# Patient Record
Sex: MALE | Race: WHITE | Employment: FULL TIME | URBAN - METROPOLITAN AREA
[De-identification: names, ages, dates, MRNs, and addresses within clinical notes are randomized per-mention and may not be internally consistent; named-entity substitution may affect disease eponyms.]

---

## 2023-08-29 ENCOUNTER — APPOINTMENT (OUTPATIENT)
Dept: GENERAL RADIOLOGY | Age: 28
End: 2023-08-29
Payer: COMMERCIAL

## 2023-08-29 ENCOUNTER — HOSPITAL ENCOUNTER (EMERGENCY)
Age: 28
Discharge: ANOTHER HOSPITAL | End: 2023-08-29
Attending: EMERGENCY MEDICINE
Payer: COMMERCIAL

## 2023-08-29 VITALS
SYSTOLIC BLOOD PRESSURE: 143 MMHG | HEART RATE: 77 BPM | TEMPERATURE: 98.3 F | DIASTOLIC BLOOD PRESSURE: 78 MMHG | RESPIRATION RATE: 18 BRPM | OXYGEN SATURATION: 97 %

## 2023-08-29 DIAGNOSIS — S62.603B: ICD-10-CM

## 2023-08-29 DIAGNOSIS — S68.119A AMPUTATION OF FINGER WITHOUT COMPLICATION, INITIAL ENCOUNTER: Primary | ICD-10-CM

## 2023-08-29 PROCEDURE — 99285 EMERGENCY DEPT VISIT HI MDM: CPT

## 2023-08-29 PROCEDURE — 6360000002 HC RX W HCPCS: Performed by: EMERGENCY MEDICINE

## 2023-08-29 PROCEDURE — 73140 X-RAY EXAM OF FINGER(S): CPT

## 2023-08-29 PROCEDURE — 96365 THER/PROPH/DIAG IV INF INIT: CPT

## 2023-08-29 PROCEDURE — 96375 TX/PRO/DX INJ NEW DRUG ADDON: CPT

## 2023-08-29 PROCEDURE — 90471 IMMUNIZATION ADMIN: CPT | Performed by: EMERGENCY MEDICINE

## 2023-08-29 PROCEDURE — 96361 HYDRATE IV INFUSION ADD-ON: CPT

## 2023-08-29 PROCEDURE — 90715 TDAP VACCINE 7 YRS/> IM: CPT | Performed by: EMERGENCY MEDICINE

## 2023-08-29 PROCEDURE — 2580000003 HC RX 258: Performed by: EMERGENCY MEDICINE

## 2023-08-29 PROCEDURE — 96376 TX/PRO/DX INJ SAME DRUG ADON: CPT

## 2023-08-29 RX ORDER — ONDANSETRON 2 MG/ML
4 INJECTION INTRAMUSCULAR; INTRAVENOUS ONCE
Status: COMPLETED | OUTPATIENT
Start: 2023-08-29 | End: 2023-08-29

## 2023-08-29 RX ORDER — MORPHINE SULFATE 4 MG/ML
4 INJECTION, SOLUTION INTRAMUSCULAR; INTRAVENOUS ONCE
Status: COMPLETED | OUTPATIENT
Start: 2023-08-29 | End: 2023-08-29

## 2023-08-29 RX ORDER — 0.9 % SODIUM CHLORIDE 0.9 %
1000 INTRAVENOUS SOLUTION INTRAVENOUS ONCE
Status: COMPLETED | OUTPATIENT
Start: 2023-08-29 | End: 2023-08-29

## 2023-08-29 RX ORDER — MORPHINE SULFATE 10 MG/ML
8 INJECTION, SOLUTION INTRAMUSCULAR; INTRAVENOUS ONCE
Status: COMPLETED | OUTPATIENT
Start: 2023-08-29 | End: 2023-08-29

## 2023-08-29 RX ADMIN — SODIUM CHLORIDE 1000 ML: 9 INJECTION, SOLUTION INTRAVENOUS at 17:48

## 2023-08-29 RX ADMIN — TETANUS TOXOID, REDUCED DIPHTHERIA TOXOID AND ACELLULAR PERTUSSIS VACCINE, ADSORBED 0.5 ML: 5; 2.5; 8; 8; 2.5 SUSPENSION INTRAMUSCULAR at 17:49

## 2023-08-29 RX ADMIN — CEFAZOLIN 1000 MG: 1 INJECTION, POWDER, FOR SOLUTION INTRAMUSCULAR; INTRAVENOUS at 18:25

## 2023-08-29 RX ADMIN — MORPHINE SULFATE 4 MG: 4 INJECTION, SOLUTION INTRAMUSCULAR; INTRAVENOUS at 18:39

## 2023-08-29 RX ADMIN — ONDANSETRON 4 MG: 2 INJECTION INTRAMUSCULAR; INTRAVENOUS at 17:49

## 2023-08-29 RX ADMIN — MORPHINE SULFATE 8 MG: 10 INJECTION INTRAVENOUS at 17:48

## 2023-08-29 ASSESSMENT — PAIN SCALES - GENERAL: PAINLEVEL_OUTOF10: 10

## 2023-08-29 ASSESSMENT — PAIN - FUNCTIONAL ASSESSMENT: PAIN_FUNCTIONAL_ASSESSMENT: 0-10

## 2023-08-29 NOTE — ED NOTES
Worker's Compensation forms sent with patient to [employer].      Compa Mariano  08/29/23 2752

## 2023-08-29 NOTE — ED NOTES
Jordin Mendes, talked to Eden Brady, asked for medical information -  given by Dara Bauer RN.      Pepe Reed [facility]  08/29/23 7270

## 2023-08-29 NOTE — ED NOTES
Called Radiology department. They will electronically send films to [facility].  (Disk also sent)     Suellen Meyers  08/29/23 [address]  08/29/23 1561

## 2023-08-29 NOTE — ED PROVIDER NOTES
[address] [facility] ED  EMERGENCY DEPARTMENT ENCOUNTER      Pt Name: Stalin Flores  MRN: [id number]  [address] [facility] [place] 1995  Date of evaluation: 8/29/2023  Provider: Jakob Roman DO    CHIEF COMPLAINT     No chief complaint on file. Chief complaint: Finger amputation    Chief complaint: This 30-year-old male presents the emergency department complaining of amputating his left index finger. Patient states he is unable to see it as he has his work gloves on patient was at work states it got caught in a \"sprocket and chain\" patient denies any other injury or trauma complains of increased focal pain to the left second digit patient is uncertain of his last tetanus shot    Nursing Notes were reviewed. REVIEW OF SYSTEMS    (2-9 systems for level 4, 10 or more for level 5)     Review of Systems  Pertinent findings documented in the history of present illness  Except as noted above the remainder of the review of systems was reviewed and negative. PAST MEDICAL HISTORY   History reviewed. No pertinent past medical history. SURGICAL HISTORY       Past Surgical History:   Procedure Laterality Date    MANDIBLE SURGERY           CURRENT MEDICATIONS       Previous Medications    No medications on file       ALLERGIES     Patient has no known allergies. FAMILY HISTORY     History reviewed. No pertinent family history. SOCIAL HISTORY       Social History     Socioeconomic History    Marital status: Single     Spouse name: None    Number of children: None    Years of education: None    Highest education level: None       PHYSICAL EXAM    (up to 7 for level 4, 8 or more for level 5)     ED Triage Vitals [08/29/23 1730]   BP Temp Temp src Pulse Respirations SpO2 Height Weight   (!) 148/87 -- -- (!) 101 25 99 % -- --       Physical Exam  General appearance: Patient is awake alert interactive appropriate and nontoxic in no distress but obvious discomfort.   Head is atraumatic normocephalic  Eyes pupils are equal

## 2023-08-29 NOTE — ED NOTES
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 Injury to left index and middle finger. Patient is Ax[employer]. Patient hand is stuck in glove. Glove removed with scissors at bedside.  Electronically signed by Benjamin Davis RN on 8/29/2023 at 6:31 PM       Benjamin Davis RN  08/29/23 1405

## 2024-01-10 ENCOUNTER — OFFICE VISIT (OUTPATIENT)
Dept: FAMILY MEDICINE CLINIC | Age: 29
End: 2024-01-10
Payer: COMMERCIAL

## 2024-01-10 VITALS
SYSTOLIC BLOOD PRESSURE: 112 MMHG | BODY MASS INDEX: 26.94 KG/M2 | HEIGHT: 71 IN | HEART RATE: 74 BPM | WEIGHT: 192.4 LBS | DIASTOLIC BLOOD PRESSURE: 62 MMHG | TEMPERATURE: 98.2 F | OXYGEN SATURATION: 98 %

## 2024-01-10 DIAGNOSIS — K64.9 ACUTE HEMORRHOID: Primary | ICD-10-CM

## 2024-01-10 PROCEDURE — 99203 OFFICE O/P NEW LOW 30 MIN: CPT

## 2024-01-10 PROCEDURE — G8419 CALC BMI OUT NRM PARAM NOF/U: HCPCS

## 2024-01-10 PROCEDURE — G8427 DOCREV CUR MEDS BY ELIG CLIN: HCPCS

## 2024-01-10 PROCEDURE — G8484 FLU IMMUNIZE NO ADMIN: HCPCS

## 2024-01-10 PROCEDURE — 4004F PT TOBACCO SCREEN RCVD TLK: CPT

## 2024-01-10 RX ORDER — GABAPENTIN 100 MG/1
100 CAPSULE ORAL 2 TIMES DAILY
COMMUNITY
Start: 2023-12-13 | End: 2024-03-12

## 2024-01-10 RX ORDER — HYDROCORTISONE ACETATE 25 MG/1
25 SUPPOSITORY RECTAL 2 TIMES DAILY PRN
Qty: 8 SUPPOSITORY | Refills: 0 | Status: SHIPPED | OUTPATIENT
Start: 2024-01-10 | End: 2024-01-14

## 2024-01-10 ASSESSMENT — ENCOUNTER SYMPTOMS
NAUSEA: 0
ANAL BLEEDING: 1
BACK PAIN: 0
BLOOD IN STOOL: 1
CONSTIPATION: 0
RESPIRATORY NEGATIVE: 1
DIARRHEA: 0
ABDOMINAL PAIN: 0
RECTAL PAIN: 1

## 2024-01-10 NOTE — PROGRESS NOTES
[facility] PRIMARY CARE          ASSESSMENT/PLAN     Will Castellanos is a 28 y.o. male who presents with:  Chief Complaint   Patient presents with    Rectal Pain     Pt thinks that he has hemorrhoids. He had a couple BM this morning and had some blood on the tissue after wiping. When he stood up he had a sharp pain that lasted for an hour. He said the pain is subsiding now. He says that his BM are pretty regular and he does not strain.        Noticed something protruding from rectom in past, this morning had BM with pain and bleeding.  Had 2 BM's this AM.   Stools were soft.   On examination there are 3 external hemorrhoids noted to the rectal area there is 1 that is a large greater than 1 cm diameter mildly erythemic.        1. Acute hemorrhoid  -     hydrocortisone (ANUSOL-HC) 25 MG suppository; Place 1 suppository rectally 2 times daily as needed for Hemorrhoids, Disp-8 suppository, R-0Normal  -     benzocaine (AMERICAINE) 20 % rectal ointment; Place rectally every 3 hours as needed., Disp-28 g, R-0, Normal        PATIENT EDUCATION:    Obtain K-Y jelly from pharmacy apply to rectal suppository before insertion.  Make sure to completely insert suppository.         PATIENT REFERRED TO:    Return if symptoms worsen or fail to improve.    DISCHARGE MEDICATIONS:  New Prescriptions    BENZOCAINE (AMERICAINE) 20 % RECTAL OINTMENT    Place rectally every 3 hours as needed.    HYDROCORTISONE (ANUSOL-HC) 25 MG SUPPOSITORY    Place 1 suppository rectally 2 times daily as needed for Hemorrhoids     Cannot display discharge medications since this is not an admission.       Darrell Bowling, APRN - CNP      SUBJECTIVE/REVIEW OF SYSTEMS     Review of Systems   Constitutional: Negative.  Negative for fever.   Respiratory: Negative.     Cardiovascular: Negative.    Gastrointestinal:  Positive for anal bleeding, blood in stool and rectal pain. Negative for abdominal pain, constipation, diarrhea and nausea.   Endocrine:

## 2024-01-10 NOTE — PATIENT INSTRUCTIONS
Obtain K-Y jelly from pharmacy apply to rectal suppository before insertion.  Make sure to completely insert suppository.